# Patient Record
Sex: MALE | Race: BLACK OR AFRICAN AMERICAN
[De-identification: names, ages, dates, MRNs, and addresses within clinical notes are randomized per-mention and may not be internally consistent; named-entity substitution may affect disease eponyms.]

---

## 2018-09-26 ENCOUNTER — HOSPITAL ENCOUNTER (EMERGENCY)
Dept: HOSPITAL 57 - BURERS | Age: 48
LOS: 1 days | Discharge: HOME | End: 2018-09-27
Payer: SELF-PAY

## 2018-09-26 DIAGNOSIS — K80.50: Primary | ICD-10-CM

## 2018-09-26 LAB
ALBUMIN SERPL BCG-MCNC: 4.3 G/DL (ref 3.5–5)
ALP SERPL-CCNC: 51 U/L (ref 40–150)
ALT SERPL W P-5'-P-CCNC: 21 U/L (ref 8–55)
ANION GAP SERPL CALC-SCNC: 12 MMOL/L (ref 10–20)
AST SERPL-CCNC: 20 U/L (ref 5–34)
BASOPHILS # BLD AUTO: 0.1 THOU/UL (ref 0–0.2)
BASOPHILS NFR BLD AUTO: 1.4 % (ref 0–1)
BILIRUB SERPL-MCNC: (no result) MG/DL (ref 0.2–1.2)
BUN SERPL-MCNC: 11 MG/DL (ref 8.9–20.6)
CALCIUM SERPL-MCNC: 9.5 MG/DL (ref 7.8–10.44)
CHLORIDE SERPL-SCNC: 104 MMOL/L (ref 98–107)
CO2 SERPL-SCNC: 30 MMOL/L (ref 22–29)
CREAT CL PREDICTED SERPL C-G-VRATE: 0 ML/MIN (ref 70–130)
EOSINOPHIL # BLD AUTO: 0.6 THOU/UL (ref 0–0.7)
EOSINOPHIL NFR BLD AUTO: 6.1 % (ref 0–10)
GLOBULIN SER CALC-MCNC: 3.5 G/DL (ref 2.4–3.5)
GLUCOSE SERPL-MCNC: 101 MG/DL (ref 70–105)
HGB BLD-MCNC: 14.1 G/DL (ref 14–18)
LIPASE SERPL-CCNC: 15 U/L (ref 8–78)
LYMPHOCYTES # BLD AUTO: 2.7 THOU/UL (ref 1.2–3.4)
LYMPHOCYTES NFR BLD AUTO: 30 % (ref 21–51)
MCH RBC QN AUTO: 27.3 PG (ref 27–31)
MCV RBC AUTO: 83.1 FL (ref 78–98)
MONOCYTES # BLD AUTO: 0.9 THOU/UL (ref 0.11–0.59)
MONOCYTES NFR BLD AUTO: 9.3 % (ref 0–10)
NEUTROPHILS # BLD AUTO: 4.8 THOU/UL (ref 1.4–6.5)
NEUTROPHILS NFR BLD AUTO: 53.2 % (ref 42–75)
PLATELET # BLD AUTO: 211 THOU/UL (ref 130–400)
POTASSIUM SERPL-SCNC: 3.7 MMOL/L (ref 3.5–5.1)
RBC # BLD AUTO: 5.15 MILL/UL (ref 4.7–6.1)
SODIUM SERPL-SCNC: 142 MMOL/L (ref 136–145)
SP GR UR STRIP: 1.02 (ref 1–1.03)
WBC # BLD AUTO: 9.1 THOU/UL (ref 4.8–10.8)

## 2018-09-26 PROCEDURE — 96374 THER/PROPH/DIAG INJ IV PUSH: CPT

## 2018-09-26 PROCEDURE — 83690 ASSAY OF LIPASE: CPT

## 2018-09-26 PROCEDURE — 81003 URINALYSIS AUTO W/O SCOPE: CPT

## 2018-09-26 PROCEDURE — 80053 COMPREHEN METABOLIC PANEL: CPT

## 2018-09-26 PROCEDURE — 96375 TX/PRO/DX INJ NEW DRUG ADDON: CPT

## 2018-09-26 PROCEDURE — 96361 HYDRATE IV INFUSION ADD-ON: CPT

## 2018-09-26 PROCEDURE — 85025 COMPLETE CBC W/AUTO DIFF WBC: CPT

## 2019-09-17 ENCOUNTER — HOSPITAL ENCOUNTER (EMERGENCY)
Dept: HOSPITAL 57 - BURERS | Age: 49
Discharge: HOME | End: 2019-09-17
Payer: SELF-PAY

## 2019-09-17 DIAGNOSIS — H55.00: ICD-10-CM

## 2019-09-17 DIAGNOSIS — H81.10: Primary | ICD-10-CM

## 2019-09-17 PROCEDURE — 93005 ELECTROCARDIOGRAM TRACING: CPT

## 2020-02-27 ENCOUNTER — HOSPITAL ENCOUNTER (EMERGENCY)
Dept: HOSPITAL 57 - BURERS | Age: 50
Discharge: HOME | End: 2020-02-27
Payer: SELF-PAY

## 2020-02-27 DIAGNOSIS — K52.9: Primary | ICD-10-CM

## 2020-02-27 LAB
ALBUMIN SERPL BCG-MCNC: 4.2 G/DL (ref 3.5–5)
ALP SERPL-CCNC: 59 U/L (ref 40–110)
ALT SERPL W P-5'-P-CCNC: 22 U/L (ref 8–55)
ANION GAP SERPL CALC-SCNC: 14 MMOL/L (ref 10–20)
AST SERPL-CCNC: 24 U/L (ref 5–34)
BASOPHILS # BLD AUTO: 0.1 THOU/UL (ref 0–0.2)
BASOPHILS NFR BLD AUTO: 1.6 % (ref 0–1)
BILIRUB SERPL-MCNC: 0.3 MG/DL (ref 0.2–1.2)
BUN SERPL-MCNC: 15 MG/DL (ref 8.9–20.6)
CALCIUM SERPL-MCNC: 8.9 MG/DL (ref 7.8–10.44)
CHLORIDE SERPL-SCNC: 105 MMOL/L (ref 98–107)
CO2 SERPL-SCNC: 26 MMOL/L (ref 22–29)
CREAT CL PREDICTED SERPL C-G-VRATE: 0 ML/MIN (ref 70–130)
EOSINOPHIL # BLD AUTO: 0.3 THOU/UL (ref 0–0.7)
EOSINOPHIL NFR BLD AUTO: 3.8 % (ref 0–10)
GLOBULIN SER CALC-MCNC: 3.5 G/DL (ref 2.4–3.5)
GLUCOSE SERPL-MCNC: 96 MG/DL (ref 70–105)
HGB BLD-MCNC: 14.1 G/DL (ref 14–18)
LIPASE SERPL-CCNC: 19 U/L (ref 8–78)
LYMPHOCYTES # BLD AUTO: 2.8 THOU/UL (ref 1.2–3.4)
LYMPHOCYTES NFR BLD AUTO: 32 % (ref 21–51)
MCH RBC QN AUTO: 26.9 PG (ref 27–31)
MCV RBC AUTO: 83 FL (ref 78–98)
MONOCYTES # BLD AUTO: 0.7 THOU/UL (ref 0.11–0.59)
MONOCYTES NFR BLD AUTO: 7.7 % (ref 0–10)
NEUTROPHILS # BLD AUTO: 4.9 THOU/UL (ref 1.4–6.5)
NEUTROPHILS NFR BLD AUTO: 54.9 % (ref 42–75)
PLATELET # BLD AUTO: 196 THOU/UL (ref 130–400)
POTASSIUM SERPL-SCNC: 4 MMOL/L (ref 3.5–5.1)
RBC # BLD AUTO: 5.25 MILL/UL (ref 4.7–6.1)
SODIUM SERPL-SCNC: 141 MMOL/L (ref 136–145)
WBC # BLD AUTO: 8.9 THOU/UL (ref 4.8–10.8)

## 2020-02-27 PROCEDURE — 96375 TX/PRO/DX INJ NEW DRUG ADDON: CPT

## 2020-02-27 PROCEDURE — 80053 COMPREHEN METABOLIC PANEL: CPT

## 2020-02-27 PROCEDURE — 83690 ASSAY OF LIPASE: CPT

## 2020-02-27 PROCEDURE — 96361 HYDRATE IV INFUSION ADD-ON: CPT

## 2020-02-27 PROCEDURE — 85025 COMPLETE CBC W/AUTO DIFF WBC: CPT

## 2020-02-27 PROCEDURE — 96374 THER/PROPH/DIAG INJ IV PUSH: CPT

## 2020-02-27 PROCEDURE — 87804 INFLUENZA ASSAY W/OPTIC: CPT

## 2020-04-29 ENCOUNTER — HOSPITAL ENCOUNTER (EMERGENCY)
Dept: HOSPITAL 57 - BURERS | Age: 50
Discharge: ANOTHER HOSPITAL | End: 2020-04-29
Payer: SELF-PAY

## 2020-04-29 ENCOUNTER — HOSPITAL ENCOUNTER (OUTPATIENT)
Dept: HOSPITAL 92 - ERS | Age: 50
Setting detail: OBSERVATION
LOS: 1 days | Discharge: HOME | End: 2020-04-30
Attending: INTERNAL MEDICINE | Admitting: INTERNAL MEDICINE
Payer: SELF-PAY

## 2020-04-29 VITALS — BODY MASS INDEX: 37.2 KG/M2

## 2020-04-29 DIAGNOSIS — R10.13: Primary | ICD-10-CM

## 2020-04-29 DIAGNOSIS — N17.9: ICD-10-CM

## 2020-04-29 DIAGNOSIS — E78.5: ICD-10-CM

## 2020-04-29 DIAGNOSIS — R94.31: ICD-10-CM

## 2020-04-29 DIAGNOSIS — E66.9: ICD-10-CM

## 2020-04-29 DIAGNOSIS — D89.2: ICD-10-CM

## 2020-04-29 DIAGNOSIS — K82.8: ICD-10-CM

## 2020-04-29 DIAGNOSIS — K76.0: Primary | ICD-10-CM

## 2020-04-29 DIAGNOSIS — I10: ICD-10-CM

## 2020-04-29 DIAGNOSIS — R73.03: ICD-10-CM

## 2020-04-29 LAB
ALBUMIN SERPL BCG-MCNC: 4.6 G/DL (ref 3.5–5)
ALP SERPL-CCNC: 56 U/L (ref 40–110)
ALT SERPL W P-5'-P-CCNC: 19 U/L (ref 8–55)
ANION GAP SERPL CALC-SCNC: 14 MMOL/L (ref 10–20)
AST SERPL-CCNC: 16 U/L (ref 5–34)
BASOPHILS # BLD AUTO: 0.2 THOU/UL (ref 0–0.2)
BASOPHILS NFR BLD AUTO: 1.4 % (ref 0–1)
BILIRUB SERPL-MCNC: 0.5 MG/DL (ref 0.2–1.2)
BUN SERPL-MCNC: 13 MG/DL (ref 8.9–20.6)
CALCIUM SERPL-MCNC: 9.6 MG/DL (ref 7.8–10.44)
CHLORIDE SERPL-SCNC: 102 MMOL/L (ref 98–107)
CO2 SERPL-SCNC: 28 MMOL/L (ref 22–29)
CREAT CL PREDICTED SERPL C-G-VRATE: 0 ML/MIN (ref 70–130)
EOSINOPHIL # BLD AUTO: 0.4 THOU/UL (ref 0–0.7)
EOSINOPHIL NFR BLD AUTO: 2.8 % (ref 0–10)
FERRITIN SERPL-MCNC: 559.19 NG/ML (ref 22–322)
GLOBULIN SER CALC-MCNC: 3.9 G/DL (ref 2.4–3.5)
GLUCOSE SERPL-MCNC: 112 MG/DL (ref 70–105)
HBCM INDEX: 0.05 S/CO (ref 0–0.79)
HBSAG INDEX: 0.27 S/CO (ref 0–0.99)
HEP A IGM S/CO: 0.15 S/CO (ref 0–0.79)
HEP C INDEX: 0.08 S/CO (ref 0–0.79)
HGB BLD-MCNC: 15.2 G/DL (ref 14–18)
IGA SERPL-MCNC: 262 MG/DL (ref 63–484)
IGG SERPL-MCNC: 1917 MG/DL (ref 540–1822)
IGM SERPL-MCNC: 106 MG/DL (ref 22–240)
IRON SERPL-MCNC: 42 UG/DL (ref 65–175)
LIPASE SERPL-CCNC: 92 U/L (ref 8–78)
LYMPHOCYTES # BLD AUTO: 2.9 THOU/UL (ref 1.2–3.4)
LYMPHOCYTES NFR BLD AUTO: 22.4 % (ref 21–51)
MCH RBC QN AUTO: 26.3 PG (ref 27–31)
MCV RBC AUTO: 84.9 FL (ref 78–98)
MONOCYTES # BLD AUTO: 1.3 THOU/UL (ref 0.11–0.59)
MONOCYTES NFR BLD AUTO: 10.2 % (ref 0–10)
NEUTROPHILS # BLD AUTO: 8.1 THOU/UL (ref 1.4–6.5)
NEUTROPHILS NFR BLD AUTO: 63.2 % (ref 42–75)
PLATELET # BLD AUTO: 210 THOU/UL (ref 130–400)
POTASSIUM SERPL-SCNC: 3.9 MMOL/L (ref 3.5–5.1)
RBC # BLD AUTO: 5.79 MILL/UL (ref 4.7–6.1)
SODIUM SERPL-SCNC: 140 MMOL/L (ref 136–145)
TROPONIN I SERPL DL<=0.01 NG/ML-MCNC: (no result) NG/ML (ref ?–0.03)
TROPONIN I SERPL DL<=0.01 NG/ML-MCNC: 0.01 NG/ML (ref ?–0.03)
UIBC SERPL-MCNC: 230 MCG/DL (ref 261–462)
WBC # BLD AUTO: 12.8 THOU/UL (ref 4.8–10.8)

## 2020-04-29 PROCEDURE — 83540 ASSAY OF IRON: CPT

## 2020-04-29 PROCEDURE — 83516 IMMUNOASSAY NONANTIBODY: CPT

## 2020-04-29 PROCEDURE — 96376 TX/PRO/DX INJ SAME DRUG ADON: CPT

## 2020-04-29 PROCEDURE — 74177 CT ABD & PELVIS W/CONTRAST: CPT

## 2020-04-29 PROCEDURE — 96375 TX/PRO/DX INJ NEW DRUG ADDON: CPT

## 2020-04-29 PROCEDURE — 80074 ACUTE HEPATITIS PANEL: CPT

## 2020-04-29 PROCEDURE — 83690 ASSAY OF LIPASE: CPT

## 2020-04-29 PROCEDURE — 80053 COMPREHEN METABOLIC PANEL: CPT

## 2020-04-29 PROCEDURE — 96372 THER/PROPH/DIAG INJ SC/IM: CPT

## 2020-04-29 PROCEDURE — 83036 HEMOGLOBIN GLYCOSYLATED A1C: CPT

## 2020-04-29 PROCEDURE — G0378 HOSPITAL OBSERVATION PER HR: HCPCS

## 2020-04-29 PROCEDURE — 93005 ELECTROCARDIOGRAM TRACING: CPT

## 2020-04-29 PROCEDURE — C9113 INJ PANTOPRAZOLE SODIUM, VIA: HCPCS

## 2020-04-29 PROCEDURE — 80048 BASIC METABOLIC PNL TOTAL CA: CPT

## 2020-04-29 PROCEDURE — 36415 COLL VENOUS BLD VENIPUNCTURE: CPT

## 2020-04-29 PROCEDURE — 86038 ANTINUCLEAR ANTIBODIES: CPT

## 2020-04-29 PROCEDURE — 82728 ASSAY OF FERRITIN: CPT

## 2020-04-29 PROCEDURE — 82570 ASSAY OF URINE CREATININE: CPT

## 2020-04-29 PROCEDURE — 86225 DNA ANTIBODY NATIVE: CPT

## 2020-04-29 PROCEDURE — 84484 ASSAY OF TROPONIN QUANT: CPT

## 2020-04-29 PROCEDURE — 76705 ECHO EXAM OF ABDOMEN: CPT

## 2020-04-29 PROCEDURE — 83550 IRON BINDING TEST: CPT

## 2020-04-29 PROCEDURE — 71045 X-RAY EXAM CHEST 1 VIEW: CPT

## 2020-04-29 PROCEDURE — 96374 THER/PROPH/DIAG INJ IV PUSH: CPT

## 2020-04-29 PROCEDURE — 94760 N-INVAS EAR/PLS OXIMETRY 1: CPT

## 2020-04-29 PROCEDURE — 85025 COMPLETE CBC W/AUTO DIFF WBC: CPT

## 2020-04-29 PROCEDURE — 82043 UR ALBUMIN QUANTITATIVE: CPT

## 2020-04-29 NOTE — CT
PRELIMINARY REPORT/[facility]/EMERGENCY AFTER HOURS PROCEDURE:



EXAM: CT Abdomen and Pelvis with Intravenous Contrast 



CLINICAL HISTORY: Epigastric pain, 



TECHNIQUE: Axial computed tomography images of the abdomen and pelvis with intravenous contrast. 



CONTRAST: With; 70ML [id number] 



COMPARISON: None provided. 



FINDINGS: 

LUNG BASES: No basilar airspace consolidation or pleural effusion. 

LIVER: Unremarkable. 

GALLBLADDER AND BILE DUCTS: Unremarkable. No calcified stone. No ductal dilation. 

PANCREAS: Unremarkable. 

SPLEEN: Unremarkable. 

ADRENAL GLANDS: Unremarkable. 

KIDNEYS, URETERS, AND BLADDER: Unremarkable. No hydronephrosis or nephrolithiasis. No ureteral or shemar
dder calculi. 

STOMACH AND BOWEL: There is questionable wall thickening of the body of stomach, gastritis cannot be 
excluded. 

There is a short segment mild wall thickening of the proximal transverse colon with multiple divertic
domo, and subtle soft tissue stranding, mild diverticulitis is a consideration. 

APPENDIX: No CT evidence for appendicitis. 

PERITONEUM: No free fluid. No free air. 

LYMPH NODES: No lymphadenopathy. 

REPRODUCTIVE: Unremarkable as visualized. 

VASCULATURE: No aortic aneurysm. 

BONES: No fracture or suspicious osseous abnormality. 

ABDOMINAL WALL AND SOFT TISSUES: Unremarkable. 



IMPRESSION: 

1. There is questionable wall thickening of the body of stomach, gastritis cannot be excluded. 

2. There is a short segment mild wall thickening of the proximal transverse colon with multiple diver
ticula, and subtle soft tissue stranding, mild diverticulitis is a consideration.



ELECTRONICALLY SIGNED BY:

Konstantin Joy MD

Apr 29, 2020 3:46:36 AM CDT





FINAL REPORT



EXAM:  CT ABDOMEN AND PELVIS:



HISTORY: 

Epigastric pain.



COMPARISON: 

None.



Procedure: 

Multiple contiguous axial images were obtained and a CT of the abdomen and pelvis with IV contrast. C
oronal reformats were performed.



FINDINGS:

Lower Chest: within normal limits.

Vessels: Normal caliber aorta.

Heart: Normal heart size.



Abdomen:

Portal vein:Patent.

Gallbladder: No calcified gallstones. Normal caliber wall.

Liver: within normal limits.

Pancreas: Mild amount of fluid adjacent to the head of the pancreas and gastric antrum. Fluid adjacen
t to the head of pancreas is presumed to be due to inflammatory process involving the gastric

antrum. Clinical correlation for pancreatitis with laboratory values is recommended.

Spleen: within normal limits.

Adrenals: within normal limits.

Kidneys: Symmetric enhancement. No obstructive uropathy.

Peritoneum: No ascites or free air, no fluid collection.

Bowel: Limited evaluation due to the lack of oral contrast administration. No evidence of bowel obstr
uction. Ileocecal junction is unremarkable. Normal caliber appendix. Scattered fecal material in a

nondistended, nondilated colon. There is nonspecific bowel wall thickening with adjacent fat strandin
g involving the gastric antrum. Additionally, there is bowel wall thickening involving the

ascending colon without evidence of adjacent inflammatory change. Scattered diverticulosis. No divert
iculitis.

Mesentery and Retroperitoneum: No enlarged mesenteric or retroperitoneal lymph nodes.

Abdominal Wall: within normal limits.



Pelvis:

Reproductive Organs: Reproductive organs are unremarkable.

Pelvis: No mass, lymphadenopathy, free air or free fluid. 

Bladder: within normal limits.



Bones: within normal limits.  



IMPRESSION:

1. This report is essentially in agreement with initial report by [facility].  Probable inflamm
atory changes involving the gastric antrum and right hemicolon.

2. Small amount of fluid adjacent to the head of the pancreas is likely due to inflammatory change in
volving the gastric antrum. Nevertheless, clinical correlation to exclude pancreatitis is

recommended.



Transcribed Date/Time: 4/29/2020 8:17 AM



Reported By: Ashlyn Yan 

Electronically Signed:  4/29/2020 1:47 PM

## 2020-04-29 NOTE — RAD
PORTABLE CHEST:

 

DATE: 4/29/2020.

 

FINDINGS: 

An AP portable film at 0130 is presented with no prior films available for comparison.

 

The heart is normal in size and the lungs are clear.  No infiltrate, effusion, or vascular congestion
 was seen.  There is no free air beneath the diaphragm. 

 

IMPRESSION: 

No acute thoracic finding.

 

POS: HOME

## 2020-04-29 NOTE — PDOC.HHP
Hospitalist HPI





- History of Present Illness


right upper quadrant pain


History of Present Illness: 





49yo M w/ no MHx presented for epigastric and RUQ pain. Started on Saturday, 

constant, pressure, initially mild but progressively worsened. Pain is not 

associated with food intake or exertion, and patient denies use of over the 

counter medications including NSAIDs. Has similar pain about 2 months ago that 

was associated with diarrhea, but this time has had no diarrhea, though 

endorses that his baseline is about 3 bowel movements daily. Bowel constituency 

is normal





On encounter, laying comfortably in bed and complains of mild RUQ pain that has 

improved. Endorses pain now is waxing and waning. Denies weight loss, chills, 

night sweats, chest pain, pleuritic pain, cough, sputum production, dyspepsia, 

diarrhea, constipation, melena, hematochezia, dysuria, hematuria.


ED Course: 





In the ED, was found to be hypertensive, elevated creatinine, and imaging 

consistent with NAFLD. Was admitted to the floor for further management





Hospitalist ROS





- Review of Systems


Constitutional: denies: fever, chills, sweats, weakness, malaise, other


Respiratory: denies: cough, dry, shortness of breath, hemoptysis, SOB with 

excertion, pleuritic pain, sputum, wheezing, other


Cardiovascular: denies: chest pain, palpitations, orthopnea, paroxysmal noc. 

dyspnea, edema, light headedness, other


Gastrointestinal: reports: abdominal pain.  denies: nausea, vomiting, diarrhea, 

constipation, melena, hematochezia, other


Genitourinary: denies: dysuria, frequency, incontinence, hematuria, retention, 

other


Musculoskeletal: denies: neck pain, shoulder pain, arm pain, back pain, hand 

pain, leg pain, foot pain, other


Neurological: denies: weakness, numbness, incoordination, change in speech, 

confusion, seizures, other





Hospitalist History





- Past Medical History


Source: patient


Cardiac: reports: no pertinent history


Pulmonary: reports: no pertinent history


CNS: reports: no pertinent history


Gastrointestinal: reports: no pertinent history


Heme/Onc: reports: no pertinent history


Hepatobiliary: reports: no pertinent history


Psych: reports: no pertinent history


Musculoskeletal: reports: no pertinent history


Rheumatologic: reports: no pertinent history


Infectious Disease: reports: no pertinent history


ENT: reports: no pertinent history


Renal/: reports: no pertinent history


Endocrine: reports: no pertinent history


Dermatology: reports: no pertinent history





- Past Surgical History


Past Surgical History: reports: no pertinent history





- Family History


Family History: reports: no pertinent history





- Exam


General Appearance: NAD, awake alert


Eye: PERRL, anicteric sclera


Neck: no JVD


Heart: RRR, no murmur, no gallops, no rubs


Respiratory: CTAB, no wheezes, no rales, no ronchi


Gastrointestinal: soft, non-distended, normal bowel sounds


Gastrointestinal - other findings: mild ruq tenderness


Extremities: no edema


Psychiatric: normal affect, normal behavior, A&O x 3





Hospitalist Results





- Labs


Lab results: 


 











Troponin I  0.012 ng/mL (< 0.028)   04/29/20  08:02    














- EKG Interpretation


EKG: 





sinus rhythm with nonspecific t-wave changes





- Radiology Interpretation


  ** CT scan - abdomen


Status: image reviewed by me, report reviewed by me





  ** US - abdomen


Status: image reviewed by me, report reviewed by me





Hospitalist H&P A/P





- Problem


(1) Nonalcoholic fatty liver disease


Status: Acute   





(2) Paraproteinemia


Code(s): D89.2 - HYPERGAMMAGLOBULINEMIA, UNSPECIFIED   Status: Acute   





(3) Hypertension


Code(s): I10 - ESSENTIAL (PRIMARY) HYPERTENSION   Status: Acute   





(4) Renal failure


Status: Acute   





- Plan


Plan: 


#NAFLD


-most likely etiology for epigastric/RUQ pain. other likely possibilities 

resolving gallstone pancreatitis, gastritis, 


-based on imaging


-has 3 risk factors (obesity, hypertension, hyperlipidemia); pending a1c





-obtain other labs to rule out other causes of fatty liver disease


-supportive measures





#paraproteinemia


elevated gamma gap





-evaluate for blood cell dyscrasias 





#hypertension


-currently stage I





-conitnue to follow; will start antihypertensive based on urine studies





#renal dysfunction


-no previous recent renal fucntion tests so can't determine DONALD or CKD


-however, patient endorses normal hydration; in context of uncontrolled 

hypertension, likely CKD





-urine protein/creatinine ratio





dispo/ppx


-full code. surogate is wife


-DVT PPx: patient ambulating. no indication


-GI PPx: pantoprazole

## 2020-04-29 NOTE — EKG
Test Reason : 

Blood Pressure : ***/*** mmHG

Vent. Rate : 072 BPM     Atrial Rate : 072 BPM

   P-R Int : 160 ms          QRS Dur : 084 ms

    QT Int : 374 ms       P-R-T Axes : 060 -04 022 degrees

   QTc Int : 409 ms

 

Normal sinus rhythm

No STEMI

Normal ECG

 

Confirmed by GOLDBERG M.D., ADRIANA ([id number]),  GAGAN MEADE ([id number]) on 4/29/2020 2:58:26 PM

 

Referred By:             Confirmed By:ADRIANA GOLDBERG M.D.

## 2020-04-30 VITALS — SYSTOLIC BLOOD PRESSURE: 138 MMHG | DIASTOLIC BLOOD PRESSURE: 88 MMHG

## 2020-04-30 VITALS — TEMPERATURE: 98.3 F

## 2020-04-30 LAB
ANION GAP SERPL CALC-SCNC: 9 MMOL/L (ref 10–20)
BUN SERPL-MCNC: 15 MG/DL (ref 8.9–20.6)
CALCIUM SERPL-MCNC: 9.5 MG/DL (ref 7.8–10.44)
CHLORIDE SERPL-SCNC: 101 MMOL/L (ref 98–107)
CO2 SERPL-SCNC: 31 MMOL/L (ref 22–29)
CREAT CL PREDICTED SERPL C-G-VRATE: 81 ML/MIN (ref 70–130)
GLUCOSE SERPL-MCNC: 92 MG/DL (ref 70–105)
POTASSIUM SERPL-SCNC: 4.4 MMOL/L (ref 3.5–5.1)
SODIUM SERPL-SCNC: 137 MMOL/L (ref 136–145)

## 2020-05-01 NOTE — DIS
DATE OF ADMISSION:  04/29/2020



DATE OF DISCHARGE:  04/30/2020



HOSPITAL COURSE:  Mr. Malone is a 50-year-old male with no medical history,

presented with epigastric and right upper quadrant pain.  The patient's cardiac

workup was negative and he was diagnosed with non-alcoholic fatty liver disease.

Lab studies ruling out fatty liver disease due to other etiologies were negative on

the day of discharge, however, required further followup by primary care physician

was noted to the patient.  In addition, the patient was diagnosed with prediabetes

and had high blood pressure that again required follow up with his primary care

physician for the diagnosis of hypertension. 



PHYSICAL EXAMINATION:

VITAL SIGNS:  Blood pressure 138/88, pulse 76, respiratory rate 14, 97% on room air,

temperature 98.3. 

GENERAL:  No apparent distress.  Alert and oriented.  Obese. 

HEENT:  Normocephalic, atraumatic. 

CARDIAC:  Regular rate and rhythm.  No murmurs, gallops, or rubs.  Normal peripheral

pulses. 

LUNGS:  Clear to auscultation bilaterally.  No wheezing, rales, or rhonchi.  No

tachypnea. 

ABDOMEN:  Soft, nondistended, nontender.  Normal bowel sounds.



DISCHARGE MEDICATIONS:  The patient was started on atorvastatin 10 mg daily based on

10-year __________ risk. 







Job ID:  [id number]

## 2020-05-05 LAB
ANA SYMPHONY (QUANTITATIVE): 0.3 RATIO
DSDNA IGG SERPL IA-ACNC: 1.8 IU/ML
DSDNA INTERPRETATION: (no result)

## 2020-07-14 ENCOUNTER — HOSPITAL ENCOUNTER (EMERGENCY)
Dept: HOSPITAL 57 - BURERS | Age: 50
Discharge: HOME | End: 2020-07-14
Payer: COMMERCIAL

## 2020-07-14 DIAGNOSIS — R19.7: Primary | ICD-10-CM

## 2020-07-14 DIAGNOSIS — R05: ICD-10-CM

## 2020-07-14 DIAGNOSIS — Z20.828: ICD-10-CM

## 2020-07-14 PROCEDURE — 87635 SARS-COV-2 COVID-19 AMP PRB: CPT

## 2020-07-14 PROCEDURE — 99284 EMERGENCY DEPT VISIT MOD MDM: CPT

## 2020-07-14 PROCEDURE — U0003 INFECTIOUS AGENT DETECTION BY NUCLEIC ACID (DNA OR RNA); SEVERE ACUTE RESPIRATORY SYNDROME CORONAVIRUS 2 (SARS-COV-2) (CORONAVIRUS DISEASE [COVID-19]), AMPLIFIED PROBE TECHNIQUE, MAKING USE OF HIGH THROUGHPUT TECHNOLOGIES AS DESCRIBED BY CMS-2020-01-R: HCPCS

## 2020-11-11 ENCOUNTER — HOSPITAL ENCOUNTER (EMERGENCY)
Dept: HOSPITAL 57 - BURERS | Age: 50
Discharge: HOME | End: 2020-11-11
Payer: SELF-PAY

## 2020-11-11 DIAGNOSIS — U07.1: Primary | ICD-10-CM

## 2020-11-11 DIAGNOSIS — I10: ICD-10-CM

## 2020-11-11 DIAGNOSIS — E11.9: ICD-10-CM

## 2020-11-11 PROCEDURE — 87635 SARS-COV-2 COVID-19 AMP PRB: CPT

## 2020-11-11 PROCEDURE — U0003 INFECTIOUS AGENT DETECTION BY NUCLEIC ACID (DNA OR RNA); SEVERE ACUTE RESPIRATORY SYNDROME CORONAVIRUS 2 (SARS-COV-2) (CORONAVIRUS DISEASE [COVID-19]), AMPLIFIED PROBE TECHNIQUE, MAKING USE OF HIGH THROUGHPUT TECHNOLOGIES AS DESCRIBED BY CMS-2020-01-R: HCPCS

## 2020-11-11 PROCEDURE — 99283 EMERGENCY DEPT VISIT LOW MDM: CPT

## 2020-11-12 LAB — SARS-COV-2 RNA RESP QL NAA+PROBE: DETECTED
